# Patient Record
Sex: MALE | Race: BLACK OR AFRICAN AMERICAN | NOT HISPANIC OR LATINO | Employment: STUDENT | URBAN - METROPOLITAN AREA
[De-identification: names, ages, dates, MRNs, and addresses within clinical notes are randomized per-mention and may not be internally consistent; named-entity substitution may affect disease eponyms.]

---
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
## 2025-05-06 ENCOUNTER — LAB VISIT (OUTPATIENT)
Dept: LAB | Facility: HOSPITAL | Age: 20
End: 2025-05-06
Payer: COMMERCIAL

## 2025-05-06 ENCOUNTER — OFFICE VISIT (OUTPATIENT)
Dept: INTERNAL MEDICINE | Facility: CLINIC | Age: 20
End: 2025-05-06
Payer: COMMERCIAL

## 2025-05-06 VITALS
SYSTOLIC BLOOD PRESSURE: 122 MMHG | DIASTOLIC BLOOD PRESSURE: 62 MMHG | HEART RATE: 99 BPM | WEIGHT: 211 LBS | OXYGEN SATURATION: 99 % | HEIGHT: 72 IN | BODY MASS INDEX: 28.58 KG/M2

## 2025-05-06 DIAGNOSIS — J36 PERITONSILLAR CELLULITIS: Primary | ICD-10-CM

## 2025-05-06 DIAGNOSIS — J36 PERITONSILLAR CELLULITIS: ICD-10-CM

## 2025-05-06 DIAGNOSIS — Z87.09 HISTORY OF PERITONSILLAR ABSCESS: ICD-10-CM

## 2025-05-06 PROCEDURE — 99214 OFFICE O/P EST MOD 30 MIN: CPT | Mod: S$GLB,,, | Performed by: NURSE PRACTITIONER

## 2025-05-06 PROCEDURE — 99999 PR PBB SHADOW E&M-EST. PATIENT-LVL IV: CPT | Mod: PBBFAC,,, | Performed by: NURSE PRACTITIONER

## 2025-05-06 PROCEDURE — 82565 ASSAY OF CREATININE: CPT

## 2025-05-06 PROCEDURE — 3008F BODY MASS INDEX DOCD: CPT | Mod: CPTII,S$GLB,, | Performed by: NURSE PRACTITIONER

## 2025-05-06 PROCEDURE — 1159F MED LIST DOCD IN RCRD: CPT | Mod: CPTII,S$GLB,, | Performed by: NURSE PRACTITIONER

## 2025-05-06 PROCEDURE — 36415 COLL VENOUS BLD VENIPUNCTURE: CPT

## 2025-05-06 PROCEDURE — 3078F DIAST BP <80 MM HG: CPT | Mod: CPTII,S$GLB,, | Performed by: NURSE PRACTITIONER

## 2025-05-06 PROCEDURE — 3074F SYST BP LT 130 MM HG: CPT | Mod: CPTII,S$GLB,, | Performed by: NURSE PRACTITIONER

## 2025-05-06 RX ORDER — CLINDAMYCIN HYDROCHLORIDE 300 MG/1
300 CAPSULE ORAL EVERY 8 HOURS
Qty: 21 CAPSULE | Refills: 0 | Status: SHIPPED | OUTPATIENT
Start: 2025-05-06 | End: 2025-05-13

## 2025-05-06 RX ORDER — PREDNISONE 20 MG/1
20 TABLET ORAL DAILY
Qty: 5 TABLET | Refills: 0 | Status: SHIPPED | OUTPATIENT
Start: 2025-05-06 | End: 2025-05-11

## 2025-05-06 NOTE — LETTER
May 6, 2025      [facility]  [address]  [facility] [address]  Phone: [phone]  Fax: [phone]       Patient: Spencer Carrizales   YOB: 2005  Date of Visit: 05/06/2025    To Whom It May Concern:    Mil Carrizales  was at [facility] on 05/06/2025. The patient may return to work/school on 5/7/25. If you have any questions or concerns, or if I can be of further assistance, please do not hesitate to contact me.    Sincerely,    BIGG Mandujano

## 2025-05-06 NOTE — PROGRESS NOTES
INTERNAL MEDICINE PROGRESS/URGENT CARE NOTE    CHIEF COMPLAINT     Chief Complaint   Patient presents with    Sore Throat       HPI     Spencer Carrizales Jr. is a 19 y.o. male who presents for an urgent visit today.    History of Present Illness    CHIEF COMPLAINT:  Spencer presents today for follow-up of sore throat    HISTORY OF PRESENT ILLNESS:  He presents with recurrent peritonsillar abscess, currently on the right side. Current symptoms began approximately 1 week ago with swelling on R side of throat and intermittent pain. He reports slight cough attributed to weather changes, but denies fever and difficulty with eating or drinking. Previous episode was characterized by severe symptoms including inability to speak and eat, which led to ER and f/u with ENT. Hasn't seen ENT since 12/2024. Had to miss f/u due to school and weather.     He is here with his grandfather.          Problem List[1]     Past Medical History:  Past Medical History:   Diagnosis Date    Allergic rhinitis 07/17/2015    Apnea of prematurity     on caffeine in NICU 1 month    Eczema     Mild persistent asthma 07/17/2015    PDA (patent ductus arteriosus)     in NICU-spont closed    Pneumonia 09/25/2012    Right sided    Premature infant with gestation of 30-35 weeks     Tinea capitis         Past Surgical History:  Past Surgical History:   Procedure Laterality Date    CHONDROPLASTY OF KNEE Left 8/15/2023    Procedure: CHONDROPLASTY, KNEE;  Surgeon: Viviane Harmon MD;  Location: [facility] OR;  Service: Orthopedics;  Laterality: Left;    CIRCUMCISION      KNEE ARTHROSCOPY W/ MENISCECTOMY Left 8/15/2023    Procedure: ARTHROSCOPY, KNEE, WITH MENISCECTOMY;  Surgeon: Viviane Harmon MD;  Location: [facility] OR;  Service: Orthopedics;  Laterality: Left;  bucket handle meniscus tear    KNEE ARTHROSCOPY W/ PLICA EXCISION Left 8/15/2023    Procedure: EXCISION, PLICA, KNEE, ARTHROSCOPIC;  Surgeon: Viviane Harmon MD;  Location: [facility] OR;  Service: Orthopedics;  Laterality: Left;  "       Allergies:  Review of patient's allergies indicates:  No Known Allergies    Home Medications:  Current Medications[2]     Review of Systems:  Review of Systems   Constitutional:  Negative for fever.   HENT:  Positive for sore throat. Negative for congestion, facial swelling, trouble swallowing and voice change.    Respiratory:  Positive for cough. Negative for shortness of breath.    Cardiovascular:  Negative for chest pain.   Neurological:  Negative for headaches.         PHYSICAL EXAM     Vitals:    05/06/25 1347   BP: 122/62   BP Location: Right arm   Patient Position: Sitting   Pulse: 99   SpO2: 99%   Weight: 95.7 kg (210 lb 15.7 oz)   Height: 6' (1.829 m)      Body mass index is 28.61 kg/m².     Physical Exam  Vitals reviewed.   Constitutional:       Appearance: Normal appearance.   HENT:      Head: Normocephalic.      Right Ear: Tympanic membrane and ear canal normal.      Left Ear: Tympanic membrane and ear canal normal.      Mouth/Throat:      Mouth: Mucous membranes are moist.      Pharynx: Uvula midline. Posterior oropharyngeal erythema present. No uvula swelling.      Comments: Swelling and erythema to R tonsil 2+, L tonsil 1+. No exudate.   Eyes:      Conjunctiva/sclera: Conjunctivae normal.      Pupils: Pupils are equal, round, and reactive to light.   Cardiovascular:      Rate and Rhythm: Normal rate and regular rhythm.   Pulmonary:      Effort: Pulmonary effort is normal.      Breath sounds: Normal breath sounds.   Skin:     General: Skin is warm and dry.   Neurological:      General: No focal deficit present.      Mental Status: He is alert.   Psychiatric:         Mood and Affect: Mood normal.         Behavior: Behavior normal.         LABS     No results found for: "LABA1C", "HGBA1C"  CMP  Sodium   Date Value Ref Range Status   12/08/2024 139 136 - 144 mmol/L Final   12/19/2018 136 136 - 145 mmol/L Final     Potassium   Date Value Ref Range Status   12/08/2024 4.1 3.6 - 5.1 mmol/L Final "   12/19/2018 4.4 3.5 - 5.1 mmol/L Final     Chloride   Date Value Ref Range Status   12/19/2018 100 95 - 110 mmol/L Final     CO2   Date Value Ref Range Status   12/08/2024 24 22 - 32 mmol/L Final   12/19/2018 25 23 - 29 mmol/L Final     Glucose   Date Value Ref Range Status   12/19/2018 96 70 - 110 mg/dL Final     BUN   Date Value Ref Range Status   12/19/2018 13 5 - 18 mg/dL Final     Blood Urea Nitrogen   Date Value Ref Range Status   12/08/2024 8 8 - 20 mg/dL Final     Creatinine   Date Value Ref Range Status   12/08/2024 0.91 0.90 - 1.30 mg/dL Final   12/19/2018 0.8 0.5 - 1.4 mg/dL Final     Calcium   Date Value Ref Range Status   12/08/2024 9.4 8.9 - 10.3 mg/dL Final   12/19/2018 9.7 8.7 - 10.5 mg/dL Final     Total Protein   Date Value Ref Range Status   12/08/2024 8 (H) 6.1 - 7.9 g/dL Final   12/19/2018 8.6 (H) 6.0 - 8.4 g/dL Final     Albumin   Date Value Ref Range Status   12/08/2024 4 3.5 - 4.8 g/dL Final   12/19/2018 3.2 3.2 - 4.7 g/dL Final     Total Bilirubin   Date Value Ref Range Status   12/08/2024 1 0.4 - 2.0 mg/dL Final   12/19/2018 0.3 0.1 - 1.0 mg/dL Final     Comment:     For infants and newborns, interpretation of results should be based  on gestational age, weight and in agreement with clinical  observations.  Premature Infant recommended reference ranges:  Up to 24 hours.............<8.0 mg/dL  Up to 48 hours............<12.0 mg/dL  3-5 days..................<15.0 mg/dL  6-29 days.................<15.0 mg/dL       Alkaline Phosphatase   Date Value Ref Range Status   12/08/2024 96 28 - 116 U/L Final   12/19/2018 200 127 - 517 U/L Final     AST   Date Value Ref Range Status   12/08/2024 18 12 - 40 U/L Final   12/19/2018 45 (H) 10 - 40 U/L Final     ALT   Date Value Ref Range Status   12/08/2024 16 5 - 56 U/L Final   12/19/2018 24 10 - 44 U/L Final     Anion Gap   Date Value Ref Range Status   12/08/2024 11 7 - 16 mmol/L Final   12/19/2018 11 8 - 16 mmol/L Final     eGFR if   "  Date Value Ref Range Status   12/19/2018 SEE COMMENT >60 mL/min/1.73 m^2 Final     eGFR if non    Date Value Ref Range Status   12/19/2018 SEE COMMENT >60 mL/min/1.73 m^2 Final     Comment:     Calculation used to obtain the estimated glomerular filtration  rate (eGFR) is the CKD-EPI equation.   Test not performed.  GFR calculation is only valid for patients   18 and older.       Lab Results   Component Value Date    WBC 3.92 (L) 12/19/2018    HGB 12.5 (L) 12/19/2018    HCT 38.5 12/19/2018    MCV 64 (L) 12/19/2018     (H) 12/19/2018     Lab Results   Component Value Date    CHOL 139 12/19/2018     No results found for: "HDL"  No results found for: "LDLCALC"  Lab Results   Component Value Date    TRIG 74 (H) 2005     No results found for: "CHOLHDL"  Lab Results   Component Value Date    TSH 1.0 2005    [id number] 7.1 2005       ASSESSMENT     1. Peritonsillar cellulitis    2. History of peritonsillar abscess           PLAN  Will start him on prednisone and clindamycin as this helped resolved issue before. Will check CT neck as well. He thankfully has f/u scheduled with ENT in 2 days. Stressed importance of keeping f/u for re-evaluation.   Er for any worsening.       1. Peritonsillar cellulitis  - clindamycin (CLEOCIN) 300 MG capsule; Take 1 capsule (300 mg total) by mouth every 8 (eight) hours. for 7 days  Dispense: 21 capsule; Refill: 0  - predniSONE (DELTASONE) 20 MG tablet; Take 1 tablet (20 mg total) by mouth once daily. for 5 days  Dispense: 5 tablet; Refill: 0  - CT Soft Tissue Neck With Contrast; Future  - Creatinine, Serum; Future    2. History of peritonsillar abscess  - CT Soft Tissue Neck With Contrast; Future  - Creatinine, Serum; Future       Follow up with PCP     Patient was counseled on when to seek emergent care. Patient's plan/treatment was discussed including medications and possible side effects. Verbalized understanding of all instructions.     This note " was generated with the assistance of ambient listening technology. Verbal consent was obtained by the patient and accompanying visitor(s) for the recording of patient appointment to facilitate this note. I attest to having reviewed and edited the generated note for accuracy, though some syntax or spelling errors may persist. Please contact the author of this note for any clarification.             JULIAN Mandujano  Department of Internal Medicine - [facility]  05/06/2025          [1]   Patient Active Problem List  Diagnosis    Allergic rhinitis    Mild persistent asthma   [2]   Current Outpatient Medications:     albuterol (VENTOLIN HFA) 90 mcg/actuation inhaler, Inhale 2 puffs into the lungs every 6 (six) hours as needed for Wheezing. Rescue, Disp: 18 g, Rfl: 5    fluticasone propionate (FLONASE) 50 mcg/actuation nasal spray, 1 spray (50 mcg total) by Each Nostril route once daily., Disp: 16 g, Rfl: 3    ibuprofen (ADVIL,MOTRIN) 600 MG tablet, Take 1 tablet (600 mg total) by mouth every 8 (eight) hours as needed for Temperature greater than., Disp: 30 tablet, Rfl: 0    ondansetron (ZOFRAN-ODT) 4 MG TbDL, DISSOLVE 1 tablet (4 mg total) by mouth every 8 (eight) hours as needed for Nausea, Disp: 12 tablet, Rfl: 0    clindamycin (CLEOCIN) 300 MG capsule, Take 1 capsule (300 mg total) by mouth every 8 (eight) hours. for 7 days, Disp: 21 capsule, Rfl: 0    predniSONE (DELTASONE) 20 MG tablet, Take 1 tablet (20 mg total) by mouth once daily. for 5 days, Disp: 5 tablet, Rfl: 0

## 2025-05-07 LAB
CREAT SERPL-MCNC: 1 MG/DL (ref 0.5–1.4)
GFR SERPLBLD CREATININE-BSD FMLA CKD-EPI: >60 ML/MIN/1.73/M2

## (undated) DEVICE — DRAPE ARTHSCP FLD CTRL POUCH

## (undated) DEVICE — UNDERGLOVES BIOGEL PI SIZE 7.5

## (undated) DEVICE — NDL HYPO STD REG BVL 25GX5/8IN

## (undated) DEVICE — SOL NACL IRR 1000ML BTL

## (undated) DEVICE — GOWN ECLIPSE REINF LV4 TWL 2XL

## (undated) DEVICE — SOL NACL IRR 3000ML

## (undated) DEVICE — PROBE ARTHO ENERGY 90 DEG

## (undated) DEVICE — APPLICATOR CHLORAPREP ORN 26ML

## (undated) DEVICE — Device

## (undated) DEVICE — DRESSING XEROFORM NONADH 1X8IN

## (undated) DEVICE — PAD ABDOMINAL STERILE 8X10IN

## (undated) DEVICE — NDL 18GA X1 1/2 REG BEVEL

## (undated) DEVICE — NDL HYPO REG 25G X 1 1/2

## (undated) DEVICE — SLEEVE PROTECTIVE 6X9 NON STER

## (undated) DEVICE — ADHESIVE MASTISOL VIAL 48/BX

## (undated) DEVICE — GAUZE SPONGE 4X4 12PLY

## (undated) DEVICE — PAD ELECTRODE STER 1.5X3

## (undated) DEVICE — COVER MAYO STAND REINFRCD 30

## (undated) DEVICE — SUT MCRYL PLUS 4-0 PS2 27IN

## (undated) DEVICE — GLOVE SURGEON SYN PF SZ 9

## (undated) DEVICE — BLADE 4.2MM PREBENT ULTRACUT

## (undated) DEVICE — WRAP KNEE ACCU THERM GEL PACK

## (undated) DEVICE — STRIP MEDI WND CLSR 1/2X4IN

## (undated) DEVICE — GOWN ECLIPSE REINF LVL4 TWL XL

## (undated) DEVICE — SYR 10CC LUER LOCK

## (undated) DEVICE — GLOVE ORTHO PF SZ 8.5

## (undated) DEVICE — GLOVE BIOGEL SKINSENSE PI 7.0